# Patient Record
Sex: FEMALE | Race: WHITE
[De-identification: names, ages, dates, MRNs, and addresses within clinical notes are randomized per-mention and may not be internally consistent; named-entity substitution may affect disease eponyms.]

---

## 2017-01-18 NOTE — HP
- Patient


Scheduled date of Surgery: 01/19/17


Scheduled Surgical Procedure: Phacoemulsification and cataract extraction with 

PCIOL


Affected Eye: Left


Chief Complaint (Indication for surgery): Decreased vision affecting ADLs, 

Decreased vision impairing reading





- Ocular History


Other Eye History: Other (glaucoma suspect)


Eye Medications: vigamox , prolensa


Previous Eye Surgery: s/p ce/pciol OD topical





- Medical History


Illnesses: Hypertension, Hypercholesterolemia, Diabetes, Other (tendonitis)


Current Medications: 


Ambulatory Orders





Gemfibrozil [Lopid -] 600 mg PO BID 01/10/12 


Losartan/Hydrochlorothiazide [Hyzaar 100-25 Tablet] 1 mg PO DAILY 01/10/12 


Aspirin [ASA -] 81 mg PO DAILY 12/05/12 


Atenolol [Tenormin -] 100 mg PO DAILY 12/05/12 


Cholecalciferol (Vitamin D3) [Vitamin D3] 1,000 unit PO DAILY 12/05/12 


Furosemide [Lasix -] 20 mg PO ASDIR 12/05/12 


Insulin Pump Cartridge [Accu-Chek D-Dipesh Plus] 1 each SQ UTDICT 12/05/12 


Metformin HCl [Riomet] 750 mg PO BID 12/05/12 


Multivitamin [Multivitamins] 1 each PO DAILY 12/05/12 


Amlodipine/Atorvastatin [Caduet 10 mg-80 mg Tablet] 1 each PO HS 10/17/13 


Ranitidine HCl [Zantac] 150 mg PO BID 10/17/13 


Calcium 250Mg/Vit-D 125 Units [Oscal 250 mg+D -] 1 combo PO Q48H 01/26/16 


Ibuprofen [Advil -] 400 mg PO QID PRN 01/26/16 


Atenolol [Tenormin] 25 mg PO HS 08/16/16 


Ezetimibe [Zetia] 10 mg PO ASDIR 01/18/17 








Allergies/Adverse Reactions: 


 Allergies











Allergy/AdvReac Type Severity Reaction Status Date / Time


 


meperidine HCl [From Demerol] AdvReac Mild  Verified 08/18/16 06:42














Ocular Examination





- Best Corrected Visual Acuity


Distance: Right eye: 20/30


Distance: Left eye: 20/60





- External/Slit Lamp Examination


Abnormalities: mild crusting of lashes ou





- Intraocular Pressure


Intraocular Pressure - Right eye: 13


Intraocular Pressure-Left eye: 13





- Lens


Lens: 2-3+ NS, 2+ psc





- Vitreous/Retina


Vitreous/Retina: C:D 0.35 m/v wnl + vit floater





- Special Examination


M - Right eye: -3.50-0.50 x 060


M - Left eye: -2.00-1.50 x 055


K - Right eye: 44/45 x115


K - Left eye: 44.50/45 x 145


AL - Right eye: 25.20


AL - Left eye: 24.5


IOL bag: +18.5


IOL sulcus: +17.7


IOL AC: +15.5





- Impression


Impression: Cataract Left Eye





- Plan


Plan: Phacoemulsification and cataract extraction - IOL Left eye


Post-hospital care will be provided in office on: 01/20/17

## 2017-01-19 ENCOUNTER — HOSPITAL ENCOUNTER (OUTPATIENT)
Dept: HOSPITAL 74 - JASU-SURG | Age: 62
Discharge: HOME | End: 2017-01-19
Attending: OPHTHALMOLOGY
Payer: COMMERCIAL

## 2017-01-19 VITALS — HEART RATE: 64 BPM | DIASTOLIC BLOOD PRESSURE: 52 MMHG | SYSTOLIC BLOOD PRESSURE: 110 MMHG

## 2017-01-19 VITALS — TEMPERATURE: 97.8 F

## 2017-01-19 VITALS — BODY MASS INDEX: 24.6 KG/M2

## 2017-01-19 DIAGNOSIS — H26.9: Primary | ICD-10-CM

## 2017-01-19 PROCEDURE — 08RK3JZ REPLACEMENT OF LEFT LENS WITH SYNTHETIC SUBSTITUTE, PERCUTANEOUS APPROACH: ICD-10-PCS | Performed by: OPHTHALMOLOGY

## 2017-01-19 NOTE — HP
History & Physical Update





- History


History: No Change





- Physical


Physical: No Change





- Assessment


Assessment: No Change





- Plan


Plan: No Change

## 2017-01-19 NOTE — OP
DATE OF OPERATION:  01/19/2017

 

PREOPERATIVE DIAGNOSIS:  Cataract, left eye.

 

POSTOPERATIVE DIAGNOSIS:  Cataract, left eye.

 

PROCEDURE:  Phacoemulsification and cataract extraction with insertion of posterior

chamber intraocular lens, left eye.

 

SURGEON:  Myla Steiner MD

 

ASSISTANT:  None.

 

ANESTHESIA:  Topical.

 

ANESTHESIOLOGIST:  Brett Crawley MD

 

OPERATIVE PROCEDURE:  The patient received viscus lidocaine gel in the holding area

and then was brought to the operating room and gently sedated and prepped and draped

in the usual sterile fashion so as to expose only the left eye.  Ophthalmic Betadine

was instilled into the inferior fornix and lashes were taped out of the surgical

field.  An eyelid speculum was placed into the left eye.  A paracentesis was made in

inferior clear cornea at the limbus and viscoelastic material was instilled into the

anterior chamber via the paracentesis.  A 2.4-mm keratome was then used to create the

main incision in temporal clear cornea at the limbus.  A continuous curvilinear

capsulorrhexis was performed using a cystotome and Utrata forceps.  Hydrodissection

of the lens cortex was performed using BSS on a cannula until the nucleus was noted

to be freely rotating.  The phacoemulsification tip was then inserted via the main

wound and used to sculpt 2 perpendicular grooves into the lens nucleus.  Viscoat was

injected into the grooves and nucleus cracker was used to crack the lens into 4

quadrants.  Each quadrant was lifted out of the capsule into the iris plane and

individually phacoemulsified.  The remaining cortical material was then aspirated

using the irrigation and aspiration port.  The capsular bag was inflated using

ProVisc and a preloaded HOYA lens model 251 power +19.5 diopters was injected into

the capsular bag and centered using a Sinskey hook.  The residual viscoelastic

material was removed from the anterior chamber using irrigation and aspiration.  The

wound edges were hydrated using BSS.  The wound was tested for leakage.  It was found

to be watertight.  Therefore TobraDex ointment was placed in the eye and the speculum

was removed from the eye.  A sterile dressing and shield were placed over the eye and

the patient was transferred to the recovery room in stable condition and told to

follow up in 1 day.

 

 

MYLA STEINER M.D.

 

[id number]

DD: 01/19/2017 11:54

DT: 01/19/2017 15:10

Job #:  [id number]

## 2017-01-19 NOTE — OP
Ophthalmology Operative Note


Pre-Operative Diagnosis: Cataract


Affected Eye: Left


Operation: Phacoemulsification and cataract extraction with PCIOL


Findings: 


cataract left eye


Post-Operative Diagnosis: Same as Pre-op


Assistant: Suze


Anesthesiologist: Brett Crawley


Anesthesia: Topical


Specimens Removed: none


Estimated blood loss: none


Drains & Tubes with Location: none


Operative Report Dictated: Yes

## 2017-12-29 ENCOUNTER — HOSPITAL ENCOUNTER (OUTPATIENT)
Dept: HOSPITAL 74 - JASU-ENDO | Age: 62
Discharge: HOME | End: 2017-12-29
Attending: INTERNAL MEDICINE
Payer: COMMERCIAL

## 2017-12-29 VITALS — HEART RATE: 66 BPM

## 2017-12-29 VITALS — DIASTOLIC BLOOD PRESSURE: 69 MMHG | SYSTOLIC BLOOD PRESSURE: 132 MMHG

## 2017-12-29 VITALS — TEMPERATURE: 97.6 F

## 2017-12-29 VITALS — BODY MASS INDEX: 24.6 KG/M2

## 2017-12-29 DIAGNOSIS — K63.5: ICD-10-CM

## 2017-12-29 DIAGNOSIS — Z12.11: Primary | ICD-10-CM

## 2017-12-29 DIAGNOSIS — Z83.71: ICD-10-CM

## 2017-12-29 DIAGNOSIS — K62.1: ICD-10-CM

## 2017-12-29 DIAGNOSIS — K57.30: ICD-10-CM

## 2017-12-29 PROCEDURE — 0DBK8ZX EXCISION OF ASCENDING COLON, VIA NATURAL OR ARTIFICIAL OPENING ENDOSCOPIC, DIAGNOSTIC: ICD-10-PCS | Performed by: INTERNAL MEDICINE

## 2017-12-29 PROCEDURE — 0DBP8ZX EXCISION OF RECTUM, VIA NATURAL OR ARTIFICIAL OPENING ENDOSCOPIC, DIAGNOSTIC: ICD-10-PCS | Performed by: INTERNAL MEDICINE

## 2017-12-29 PROCEDURE — 0DBL8ZX EXCISION OF TRANSVERSE COLON, VIA NATURAL OR ARTIFICIAL OPENING ENDOSCOPIC, DIAGNOSTIC: ICD-10-PCS | Performed by: INTERNAL MEDICINE

## 2018-01-02 NOTE — PATH
Surgical Pathology Report



Patient Name:  AGA ROSENBERG

Accession #:  [id number]

[facility]. Rec. #:  [id number]                                                        

   /Age/Gender:  1955 (Age: 62) / F

Account:  [id number]                                                          

             Location: [facility]ENDOSCOPY

Taken:  2017

Received:  2017

Reported:  2018

Physicians:  Gracie Orr M.D.

  



Specimen(s) Received

A: RECTAL POLYP 

B: BX TRANSVERSE COLON 

C: BX RIGHT COLON POLYP 





Clinical History

Preoperative diagnosis: History of colon polyp

Postoperative diagnosis: Diverticulosis, colon polyps







Final Diagnosis

A. COLON, RECTUM, BIOPSY:

HYPERPLASTIC POLYP.



B. COLON, TRANSVERSE, BIOPSY:

COLONIC MUCOSA WITH FOLLICULAR LYMPHOID HYPERPLASIA OF MUCOSA ASSOCIATED

LYMPHOID TISSUE.

NO ACTIVE INFLAMMATION, ARCHITECTURAL DISTORTION, GRANULOMAS, OR DYSPLASIA

IDENTIFIED.



C. COLON, RIGHT, BIOPSY:

COLONIC MUCOSA WITH FOLLICULAR LYMPHOID HYPERPLASIA OF MUCOSA ASSOCIATED

LYMPHOID TISSUE.

NO ACTIVE INFLAMMATION, ARCHITECTURAL DISTORTION, GRANULOMAS, OR DYSPLASIA

IDENTIFIED.



Comment:  The findings may indicate some form of antigenic stimulation to the GI

tract.  







***Electronically Signed***

Brandon Johnston M.D.





Gross Description

A.  Received in formalin, labeled "biopsy rectal polyp" are 3 tan, irregular

portions of soft tissue ranging from 0.3-0.4 cm. in greatest dimension. The

specimens are submitted in toto in one cassette.



B.  Received in formalin, labeled "biopsy transverse polyps" are 5 tan,

irregular portions of soft tissue ranging from 0.1-0.5 cm. in greatest

dimension. The specimens are submitted in toto in one cassette.



C.  Received in formalin, labeled "biopsy right colon polyp" are 3 tan,

irregular portions of soft tissue ranging from 0.1-0.6 cm. in greatest

dimension. The specimens are submitted in toto in one cassette.

/2017



saudi

## 2022-03-13 ENCOUNTER — HOSPITAL ENCOUNTER (OUTPATIENT)
Dept: HOSPITAL 74 - JER | Age: 67
Setting detail: OBSERVATION
LOS: 3 days | Discharge: HOME | End: 2022-03-16
Attending: INTERNAL MEDICINE | Admitting: INTERNAL MEDICINE
Payer: COMMERCIAL

## 2022-03-13 VITALS — BODY MASS INDEX: 24.3 KG/M2

## 2022-03-13 DIAGNOSIS — E78.5: ICD-10-CM

## 2022-03-13 DIAGNOSIS — K38.2: ICD-10-CM

## 2022-03-13 DIAGNOSIS — K80.20: ICD-10-CM

## 2022-03-13 DIAGNOSIS — K57.90: ICD-10-CM

## 2022-03-13 DIAGNOSIS — I10: ICD-10-CM

## 2022-03-13 DIAGNOSIS — K22.2: ICD-10-CM

## 2022-03-13 DIAGNOSIS — I83.90: ICD-10-CM

## 2022-03-13 DIAGNOSIS — N28.9: ICD-10-CM

## 2022-03-13 DIAGNOSIS — E78.00: ICD-10-CM

## 2022-03-13 DIAGNOSIS — R07.89: ICD-10-CM

## 2022-03-13 DIAGNOSIS — E11.9: ICD-10-CM

## 2022-03-13 DIAGNOSIS — K21.9: Primary | ICD-10-CM

## 2022-03-13 DIAGNOSIS — Z88.8: ICD-10-CM

## 2022-03-13 DIAGNOSIS — K22.4: ICD-10-CM

## 2022-03-13 DIAGNOSIS — Z96.41: ICD-10-CM

## 2022-03-13 DIAGNOSIS — R13.10: ICD-10-CM

## 2022-03-13 DIAGNOSIS — K29.70: ICD-10-CM

## 2022-03-13 DIAGNOSIS — D64.9: ICD-10-CM

## 2022-03-13 LAB
ALBUMIN SERPL-MCNC: 3.8 G/DL (ref 3.4–5)
ALP SERPL-CCNC: 70 U/L (ref 45–117)
ALT SERPL-CCNC: 53 U/L (ref 13–61)
ANION GAP SERPL CALC-SCNC: 6 MMOL/L (ref 8–16)
APTT BLD: 33.6 SECONDS (ref 25.2–36.5)
AST SERPL-CCNC: 29 U/L (ref 15–37)
BASOPHILS # BLD: 0.9 % (ref 0–2)
BILIRUB SERPL-MCNC: 0.3 MG/DL (ref 0.2–1)
BUN SERPL-MCNC: 26 MG/DL (ref 7–18)
CALCIUM SERPL-MCNC: 8.8 MG/DL (ref 8.5–10.1)
CHLORIDE SERPL-SCNC: 107 MMOL/L (ref 98–107)
CO2 SERPL-SCNC: 25 MMOL/L (ref 21–32)
CREAT SERPL-MCNC: 1.1 MG/DL (ref 0.55–1.3)
DEPRECATED RDW RBC AUTO: 15.6 % (ref 11.6–15.6)
EOSINOPHIL # BLD: 3.1 % (ref 0–4.5)
GLUCOSE SERPL-MCNC: 140 MG/DL (ref 74–106)
HCT VFR BLD CALC: 33.9 % (ref 32.4–45.2)
HGB BLD-MCNC: 11.4 GM/DL (ref 10.7–15.3)
INR BLD: 1 (ref 0.83–1.09)
LIPASE SERPL-CCNC: 166 U/L (ref 73–393)
LYMPHOCYTES # BLD: 21.2 % (ref 8–40)
MAGNESIUM SERPL-MCNC: 2 MG/DL (ref 1.8–2.4)
MCH RBC QN AUTO: 28.9 PG (ref 25.7–33.7)
MCHC RBC AUTO-ENTMCNC: 33.8 G/DL (ref 32–36)
MCV RBC: 85.6 FL (ref 80–96)
MONOCYTES # BLD AUTO: 8.8 % (ref 3.8–10.2)
NEUTROPHILS # BLD: 66 % (ref 42.8–82.8)
PLATELET # BLD AUTO: 296 10^3/UL (ref 134–434)
PMV BLD: 7 FL (ref 7.5–11.1)
PROT SERPL-MCNC: 6.8 G/DL (ref 6.4–8.2)
PT PNL PPP: 11.5 SEC (ref 9.7–13)
RBC # BLD AUTO: 3.96 M/MM3 (ref 3.6–5.2)
SODIUM SERPL-SCNC: 138 MMOL/L (ref 136–145)
WBC # BLD AUTO: 5.5 K/MM3 (ref 4–10)

## 2022-03-13 PROCEDURE — G0378 HOSPITAL OBSERVATION PER HR: HCPCS

## 2022-03-13 PROCEDURE — U0003 INFECTIOUS AGENT DETECTION BY NUCLEIC ACID (DNA OR RNA); SEVERE ACUTE RESPIRATORY SYNDROME CORONAVIRUS 2 (SARS-COV-2) (CORONAVIRUS DISEASE [COVID-19]), AMPLIFIED PROBE TECHNIQUE, MAKING USE OF HIGH THROUGHPUT TECHNOLOGIES AS DESCRIBED BY CMS-2020-01-R: HCPCS

## 2022-03-13 PROCEDURE — U0005 INFEC AGEN DETEC AMPLI PROBE: HCPCS

## 2022-03-14 LAB
ALBUMIN SERPL-MCNC: 3.5 G/DL (ref 3.4–5)
ALP SERPL-CCNC: 57 U/L (ref 45–117)
ALT SERPL-CCNC: 48 U/L (ref 13–61)
ANION GAP SERPL CALC-SCNC: 5 MMOL/L (ref 8–16)
AST SERPL-CCNC: 24 U/L (ref 15–37)
BASOPHILS # BLD: 0.7 % (ref 0–2)
BILIRUB SERPL-MCNC: 0.2 MG/DL (ref 0.2–1)
BUN SERPL-MCNC: 22.5 MG/DL (ref 7–18)
CALCIUM SERPL-MCNC: 8.8 MG/DL (ref 8.5–10.1)
CHLORIDE SERPL-SCNC: 110 MMOL/L (ref 98–107)
CHOLEST SERPL-MCNC: 120 MG/DL (ref 50–200)
CO2 SERPL-SCNC: 26 MMOL/L (ref 21–32)
CREAT SERPL-MCNC: 0.8 MG/DL (ref 0.55–1.3)
DEPRECATED RDW RBC AUTO: 15.6 % (ref 11.6–15.6)
EOSINOPHIL # BLD: 2.6 % (ref 0–4.5)
GLUCOSE SERPL-MCNC: 141 MG/DL (ref 74–106)
HCT VFR BLD CALC: 31 % (ref 32.4–45.2)
HDLC SERPL-MCNC: 44 MG/DL (ref 40–60)
HGB BLD-MCNC: 10.5 GM/DL (ref 10.7–15.3)
LDLC SERPL CALC-MCNC: 65 MG/DL (ref 5–100)
LYMPHOCYTES # BLD: 18.1 % (ref 8–40)
MAGNESIUM SERPL-MCNC: 2.3 MG/DL (ref 1.8–2.4)
MCH RBC QN AUTO: 29.1 PG (ref 25.7–33.7)
MCHC RBC AUTO-ENTMCNC: 33.8 G/DL (ref 32–36)
MCV RBC: 86.1 FL (ref 80–96)
MONOCYTES # BLD AUTO: 7.9 % (ref 3.8–10.2)
NEUTROPHILS # BLD: 70.7 % (ref 42.8–82.8)
PHOSPHATE SERPL-MCNC: 3.3 MG/DL (ref 2.5–4.9)
PLATELET # BLD AUTO: 249 10^3/UL (ref 134–434)
PMV BLD: 7.3 FL (ref 7.5–11.1)
PROT SERPL-MCNC: 6.1 G/DL (ref 6.4–8.2)
RBC # BLD AUTO: 3.6 M/MM3 (ref 3.6–5.2)
SODIUM SERPL-SCNC: 140 MMOL/L (ref 136–145)
TRIGL SERPL-MCNC: 103 MG/DL (ref 0–150)
WBC # BLD AUTO: 5.7 K/MM3 (ref 4–10)

## 2022-03-14 PROCEDURE — 3E033GC INTRODUCTION OF OTHER THERAPEUTIC SUBSTANCE INTO PERIPHERAL VEIN, PERCUTANEOUS APPROACH: ICD-10-PCS | Performed by: INTERNAL MEDICINE

## 2022-03-14 PROCEDURE — 3E0337Z INTRODUCTION OF ELECTROLYTIC AND WATER BALANCE SUBSTANCE INTO PERIPHERAL VEIN, PERCUTANEOUS APPROACH: ICD-10-PCS | Performed by: INTERNAL MEDICINE

## 2022-03-14 PROCEDURE — 3E023GC INTRODUCTION OF OTHER THERAPEUTIC SUBSTANCE INTO MUSCLE, PERCUTANEOUS APPROACH: ICD-10-PCS | Performed by: INTERNAL MEDICINE

## 2022-03-14 RX ADMIN — INSULIN ASPART SCH: 100 INJECTION, SOLUTION INTRAVENOUS; SUBCUTANEOUS at 22:01

## 2022-03-14 RX ADMIN — ATENOLOL SCH MG: 50 TABLET ORAL at 21:12

## 2022-03-14 RX ADMIN — AMLODIPINE BESYLATE SCH MG: 10 TABLET ORAL at 21:12

## 2022-03-14 RX ADMIN — INSULIN ASPART SCH: 100 INJECTION, SOLUTION INTRAVENOUS; SUBCUTANEOUS at 06:36

## 2022-03-14 RX ADMIN — INSULIN ASPART SCH: 100 INJECTION, SOLUTION INTRAVENOUS; SUBCUTANEOUS at 11:39

## 2022-03-14 RX ADMIN — ATORVASTATIN CALCIUM SCH MG: 80 TABLET, FILM COATED ORAL at 21:11

## 2022-03-14 RX ADMIN — INSULIN ASPART SCH: 100 INJECTION, SOLUTION INTRAVENOUS; SUBCUTANEOUS at 17:14

## 2022-03-15 RX ADMIN — INSULIN ASPART SCH: 100 INJECTION, SOLUTION INTRAVENOUS; SUBCUTANEOUS at 21:33

## 2022-03-15 RX ADMIN — INSULIN ASPART SCH: 100 INJECTION, SOLUTION INTRAVENOUS; SUBCUTANEOUS at 17:02

## 2022-03-15 RX ADMIN — ATENOLOL SCH MG: 50 TABLET ORAL at 21:32

## 2022-03-15 RX ADMIN — LOSARTAN POTASSIUM AND HYDROCHLOROTHIAZIDE TABLETS SCH TAB: 50; 12.5 TABLET, FILM COATED ORAL at 11:53

## 2022-03-15 RX ADMIN — INSULIN ASPART SCH: 100 INJECTION, SOLUTION INTRAVENOUS; SUBCUTANEOUS at 11:57

## 2022-03-15 RX ADMIN — FENOFIBRIC ACID SCH: 45 CAPSULE, DELAYED RELEASE ORAL at 11:57

## 2022-03-15 RX ADMIN — ATORVASTATIN CALCIUM SCH MG: 80 TABLET, FILM COATED ORAL at 21:33

## 2022-03-15 RX ADMIN — AMLODIPINE BESYLATE SCH MG: 10 TABLET ORAL at 21:37

## 2022-03-15 RX ADMIN — PANTOPRAZOLE SODIUM SCH MG: 40 TABLET, DELAYED RELEASE ORAL at 11:54

## 2022-03-15 RX ADMIN — ATENOLOL SCH MG: 50 TABLET ORAL at 11:54

## 2022-03-15 RX ADMIN — ASPIRIN SCH MG: 81 TABLET, COATED ORAL at 11:53

## 2022-03-15 RX ADMIN — PRIMIDONE SCH MG: 50 TABLET ORAL at 11:53

## 2022-03-16 VITALS — HEART RATE: 63 BPM | SYSTOLIC BLOOD PRESSURE: 133 MMHG | DIASTOLIC BLOOD PRESSURE: 54 MMHG

## 2022-03-16 VITALS — TEMPERATURE: 97.6 F

## 2022-03-16 RX ADMIN — INSULIN ASPART SCH: 100 INJECTION, SOLUTION INTRAVENOUS; SUBCUTANEOUS at 06:23

## 2022-03-16 RX ADMIN — LOSARTAN POTASSIUM AND HYDROCHLOROTHIAZIDE TABLETS SCH TAB: 50; 12.5 TABLET, FILM COATED ORAL at 09:16

## 2022-03-16 RX ADMIN — ASPIRIN SCH MG: 81 TABLET, COATED ORAL at 09:16

## 2022-03-16 RX ADMIN — FENOFIBRIC ACID SCH MG: 45 CAPSULE, DELAYED RELEASE ORAL at 09:16

## 2022-03-16 RX ADMIN — PRIMIDONE SCH MG: 50 TABLET ORAL at 09:16

## 2022-03-16 RX ADMIN — INSULIN ASPART SCH: 100 INJECTION, SOLUTION INTRAVENOUS; SUBCUTANEOUS at 12:48

## 2022-03-16 RX ADMIN — INSULIN ASPART SCH: 100 INJECTION, SOLUTION INTRAVENOUS; SUBCUTANEOUS at 07:58

## 2022-03-16 RX ADMIN — PANTOPRAZOLE SODIUM SCH MG: 40 TABLET, DELAYED RELEASE ORAL at 09:16

## 2022-03-16 RX ADMIN — ATENOLOL SCH MG: 50 TABLET ORAL at 09:16

## 2022-09-21 ENCOUNTER — HOSPITAL ENCOUNTER (OUTPATIENT)
Dept: HOSPITAL 74 - JASU-ENDO | Age: 67
Discharge: HOME | End: 2022-09-21
Attending: INTERNAL MEDICINE
Payer: COMMERCIAL

## 2022-09-21 VITALS — RESPIRATION RATE: 19 BRPM

## 2022-09-21 VITALS — HEART RATE: 64 BPM | SYSTOLIC BLOOD PRESSURE: 137 MMHG | DIASTOLIC BLOOD PRESSURE: 50 MMHG

## 2022-09-21 VITALS — TEMPERATURE: 97 F

## 2022-09-21 VITALS — BODY MASS INDEX: 25 KG/M2

## 2022-09-21 DIAGNOSIS — K31.7: ICD-10-CM

## 2022-09-21 DIAGNOSIS — Z12.11: Primary | ICD-10-CM

## 2022-09-21 DIAGNOSIS — I10: ICD-10-CM

## 2022-09-21 DIAGNOSIS — K25.7: ICD-10-CM

## 2022-09-21 DIAGNOSIS — D12.2: ICD-10-CM

## 2022-09-21 DIAGNOSIS — Z86.010: ICD-10-CM

## 2022-09-21 DIAGNOSIS — Z80.0: ICD-10-CM

## 2022-09-21 DIAGNOSIS — K57.30: ICD-10-CM

## 2022-09-21 DIAGNOSIS — Z83.71: ICD-10-CM

## 2022-09-21 DIAGNOSIS — D50.0: ICD-10-CM

## 2022-09-21 DIAGNOSIS — K44.9: ICD-10-CM

## 2022-09-21 DIAGNOSIS — Z79.4: ICD-10-CM

## 2022-09-21 DIAGNOSIS — E11.9: ICD-10-CM

## 2022-09-21 PROCEDURE — 0DBK8ZX EXCISION OF ASCENDING COLON, VIA NATURAL OR ARTIFICIAL OPENING ENDOSCOPIC, DIAGNOSTIC: ICD-10-PCS | Performed by: INTERNAL MEDICINE

## 2022-09-21 PROCEDURE — 0DB68ZX EXCISION OF STOMACH, VIA NATURAL OR ARTIFICIAL OPENING ENDOSCOPIC, DIAGNOSTIC: ICD-10-PCS | Performed by: INTERNAL MEDICINE

## 2022-09-21 PROCEDURE — 0DB78ZX EXCISION OF STOMACH, PYLORUS, VIA NATURAL OR ARTIFICIAL OPENING ENDOSCOPIC, DIAGNOSTIC: ICD-10-PCS | Performed by: INTERNAL MEDICINE
